# Patient Record
Sex: MALE | URBAN - METROPOLITAN AREA
[De-identification: names, ages, dates, MRNs, and addresses within clinical notes are randomized per-mention and may not be internally consistent; named-entity substitution may affect disease eponyms.]

---

## 2017-02-15 NOTE — PATIENT DISCUSSION
Continue: PreserVision AREDS 2 (vit c,g-vc-lttup-lutein-zeaxan): capsule: 046-221-97-7 mg-unit-mg-mg 1 capsule twice a day by mouth

## 2017-04-12 NOTE — PATIENT DISCUSSION
Continue: PreserVision AREDS 2 (vit c,v-mq-ioegw-lutein-zeaxan): capsule: 803-483-52-6 mg-unit-mg-mg 1 capsule twice a day by mouth

## 2017-04-12 NOTE — PATIENT DISCUSSION
CATARACTS OU:  NOT MEDICALLY INDICATED AT THIS TIME. CONTINUE TO MONITOR. ADVISED PT WE CAN REFER FOR REFRACTION WITH GENERAL OPHTHALMOLOGY IF THEY FEEL GLASSES RX NEEDS TO BE UPDATED.

## 2017-06-14 NOTE — PATIENT DISCUSSION
Continue: PreserVision AREDS 2 (vit c,k-gi-koyjc-lutein-zeaxan): capsule: 018-469-18-5 mg-unit-mg-mg 1 capsule twice a day by mouth

## 2017-06-16 NOTE — PATIENT DISCUSSION
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

## 2017-06-16 NOTE — PATIENT DISCUSSION
Continue: PreserVision AREDS 2 (vit c,m-io-djzcp-lutein-zeaxan): capsule: 663-547-93-5 mg-unit-mg-mg 1 capsule twice a day by mouth

## 2017-06-19 NOTE — PATIENT DISCUSSION
INFLAMMATION BETTER, BUT STILL UNABLE TO VIEW THE BACK OF THE EYE. BSCAN SHOWED NO ABCESS OR RD. CONTINUE DROPS AS DIRECTED AND FOLLOW UP IN 3 DAYS.

## 2017-06-19 NOTE — PATIENT DISCUSSION
Continue: PreserVision AREDS 2 (vit c,z-ru-kidso-lutein-zeaxan): capsule: 325-221-15-1 mg-unit-mg-mg 1 capsule twice a day by mouth

## 2017-06-22 NOTE — PATIENT DISCUSSION
Continue: PreserVision AREDS 2 (vit c,h-fq-zcjvf-lutein-zeaxan): capsule: 742-105-48-1 mg-unit-mg-mg 1 capsule twice a day by mouth

## 2017-06-22 NOTE — PATIENT DISCUSSION
CONTINUE DROPS AS DIRECTED. WILL DO BSCAN ON MONDAY TO VIEW RETINA. FIBRIN OBSCURRING PUPIL ALMOST RESOLVED TODAY.

## 2017-06-26 NOTE — PATIENT DISCUSSION
CONTINUE DROPS AS DIRECTED. RESTART LEVAQUIN 500MG;  FIBRIN OBSCURRING PUPIL RESOLVED TODAY. WILL CONTINUE TO MONITOR CLOSELY.

## 2017-06-26 NOTE — PATIENT DISCUSSION
Continue: PreserVision AREDS 2 (vit c,t-bz-tlgqw-lutein-zeaxan): capsule: 552-038-15-2 mg-unit-mg-mg 1 capsule twice a day by mouth

## 2017-06-29 NOTE — PATIENT DISCUSSION
Continue: PreserVision AREDS 2 (vit c,v-rs-bytlh-lutein-zeaxan): capsule: 204-591-26-2 mg-unit-mg-mg 1 capsule twice a day by mouth

## 2017-07-03 NOTE — PATIENT DISCUSSION
BSCAN IS CLEARING. PT HESITANT ABOUT SURGERY. ADVISED PT OK TO FINISH LEVAQUIN THEN STOP, BUT CONTINUE DROPS [id number]. PT TO CALL IF ANY CHANGES IN FOR THE WORSE.

## 2017-07-03 NOTE — PATIENT DISCUSSION
Continue: PreserVision AREDS 2 (vit c,f-on-frvtt-lutein-zeaxan): capsule: 275-367-87-8 mg-unit-mg-mg 1 capsule twice a day by mouth

## 2017-07-10 NOTE — PATIENT DISCUSSION
Continue: PreserVision AREDS 2 (vit c,u-zx-usgwm-lutein-zeaxan): capsule: 891-567-45-0 mg-unit-mg-mg 1 capsule twice a day by mouth

## 2017-07-17 NOTE — PATIENT DISCUSSION
Continue: PreserVision AREDS 2 (vit c,w-gq-ivvzc-lutein-zeaxan): capsule: 394-014-58-6 mg-unit-mg-mg 1 capsule twice a day by mouth

## 2017-07-17 NOTE — PATIENT DISCUSSION
STILL NO VIEW OF RETINA. BSCAN SHOWS POSS ERM/THICKENED POSTERIOR HYALOID, BUT NO DETACHMENT. PT STILL PREFERS TO WAIT TO SEE IF WILL CLEAR ALONE VS SURGERY. WILL MONITOR CLOSELY. PT TO CALL WITH ANY NEW SYMPTOMS.

## 2017-07-31 NOTE — PATIENT DISCUSSION
Continue: PreserVision AREDS 2 (vit c,h-ju-mxtnp-lutein-zeaxan): capsule: 537-070-63-2 mg-unit-mg-mg 1 capsule twice a day by mouth

## 2017-08-16 NOTE — PATIENT DISCUSSION
Continue: PreserVision AREDS 2 (vit c,f-xm-plkan-lutein-zeaxan): capsule: 239-504-97-3 mg-unit-mg-mg 1 capsule twice a day by mouth

## 2017-08-16 NOTE — PATIENT DISCUSSION
NEW RETINAL DETACHMENT OD: DISCUSSED OPTION OF SURGERY W/ RISKS VS BENEFITS. UNLIKELY TO IMPROVE VISION. WILL DISCUSS WITH RODNEY BARCENAS TO SEE IF THEY WOULD RECOMMEND SURGERY.

## 2017-08-23 NOTE — PATIENT DISCUSSION
Continue: PreserVision AREDS 2 (vit c,w-ub-wufug-lutein-zeaxan): capsule: 409-325-56-6 mg-unit-mg-mg 1 capsule twice a day by mouth

## 2017-08-23 NOTE — PATIENT DISCUSSION
NO SIGN OF RECURRENT INFECTION. ADVISED PT TO CONTINUE ANTIBIOTICS DROPS AS DIRECTED AND ALSO RESTART DUREZOL Q3HR OD.   ALSO GAVE WRITTEN RX FOR ATROPINE Q12HRS OD.

## 2017-09-06 NOTE — PATIENT DISCUSSION
AMD WET OS: STABLE - WILL MONITOR CLOSELY DUE TO BEING MONOCULAR AFTER ENDOPHTHALMITIS IN THE RIGHT EYE -  NO INJECTION NEEDED TODAY. PRESCRIBE AREDS 2 VITAMINS / AMSLER GRID QD/ UV PROTECTION. SMOKING CESSATION EMPHASIZED. RETURN FOR FOLLOW-UP AS SCHEDULED. CALL IF ANY NEW CHANGES OR CONCERNS.

## 2017-10-04 NOTE — PATIENT DISCUSSION
AMD WET OS: STABLE -  NO INJECTION NEEDED TODAY. PRESCRIBE AREDS 2 VITAMINS / AMSLER GRID QD/ UV PROTECTION. SMOKING CESSATION EMPHASIZED. RETURN FOR FOLLOW-UP AS SCHEDULED. CALL IF ANY NEW CHANGES OR CONCERNS.

## 2017-10-04 NOTE — PATIENT DISCUSSION
Continue: PreserVision AREDS 2 (vit c,v-gm-zsenf-lutein-zeaxan): capsule: 589-919-63-4 mg-unit-mg-mg 1 capsule twice a day by mouth

## 2018-02-21 NOTE — PATIENT DISCUSSION
CHRONIC TOTAL RETINAL DETACHMENT OD: NLP VISION TODAY DUE TO OPAQUE VITREOUS AND AC HEMORRHAGE AFTER URGENT CATARACT EXTRACTION SURGERY FOR PHACO-ANAPHYLAXIS. TX OPTIONS DISCUSSED, INCLUDING VITRECTOMY SURGERY. GIVEN HER GUARDED VISUAL PROGNOSIS (PRE-OP LP VISION ONLY), SHE ELECTED TO CONTINUE WITH OBSERVATION AND POSTOP CARE ONLY.

## 2018-02-21 NOTE — PATIENT DISCUSSION
Continue: Besivance (besifloxacin): drops,suspension: 0.6% 1 drop twice a day as directed into right eye

## 2018-02-21 NOTE — PATIENT DISCUSSION
Continue: PreserVision AREDS 2 (vit c,x-jw-mituy-lutein-zeaxan): capsule: 899-310-70-1 mg-unit-mg-mg 1 capsule twice a day by mouth

## 2018-02-21 NOTE — PATIENT DISCUSSION
Discussed vitrectomy surgery including risks, expected benefits, and alternatives. Reviewed potential for complications including infection, bleeding, retinal tear/detachment, glaucoma, cornea decompensation, need for additional procedures, and lack of improvement or decrease/loss in vision.

## 2018-02-21 NOTE — PATIENT DISCUSSION
RETINA IS ATTACHED OS; NO HOLES OR TEARS SEEN ON DILATED EXAM TODAY.  RETINAL DETACHMENT SIGNS AND SYMPTOMS REVIEWED

## 2018-02-21 NOTE — PATIENT DISCUSSION
AMD (WET), OS:  REVIEWED RISK AND BENEFITS OF TREAT AND EXTEND REGIMEN VS PRN TREATMENT. THE PATIENT PREFERS TO CONTINUE WITH PRN TREATMENT OS. RETURN TO DR Roslyn Santos AS SCHEDULED.

## 2018-02-21 NOTE — PATIENT DISCUSSION
RETURN TO DR GUEVARA IN 1 WEEK FOR POSTOP CARE. RETURN TO DR Daniel Trotter IN 1 MONTH FOR DILATED EXAM OU.

## 2021-02-08 ENCOUNTER — PREPPED CHART (OUTPATIENT)
Dept: URBAN - METROPOLITAN AREA CLINIC 16 | Facility: CLINIC | Age: 51
End: 2021-02-08

## 2022-02-08 ASSESSMENT — TONOMETRY
OD_IOP_MMHG: 17
OS_IOP_MMHG: 17

## 2022-08-29 PROBLEM — F17.200 TOBACCO USE DISORDER: Status: ACTIVE | Noted: 2022-08-29

## 2022-08-29 PROBLEM — G47.33 OBSTRUCTIVE SLEEP APNEA: Status: ACTIVE | Noted: 2022-08-29

## 2022-08-29 PROBLEM — K43.2 VENTRAL INCISIONAL HERNIA: Status: ACTIVE | Noted: 2022-08-29

## 2022-08-29 PROBLEM — E66.9 OBESITY (BMI 30.0-34.9): Status: ACTIVE | Noted: 2022-08-29

## 2022-08-29 PROBLEM — E04.1 THYROID NODULE: Status: ACTIVE | Noted: 2022-08-29

## 2022-08-29 PROBLEM — G47.09 OTHER INSOMNIA: Status: ACTIVE | Noted: 2022-08-29

## 2022-08-29 PROBLEM — Z96.641 HISTORY OF RIGHT HIP REPLACEMENT: Status: ACTIVE | Noted: 2022-08-29

## 2025-04-29 ENCOUNTER — COMPREHENSIVE EXAM (OUTPATIENT)
Age: 55
End: 2025-04-29

## 2025-04-29 DIAGNOSIS — H25.13: ICD-10-CM

## 2025-04-29 DIAGNOSIS — H52.4: ICD-10-CM

## 2025-04-29 DIAGNOSIS — H04.123: ICD-10-CM

## 2025-04-29 DIAGNOSIS — Z01.00: ICD-10-CM

## 2025-04-29 PROCEDURE — 92014 COMPRE OPH EXAM EST PT 1/>: CPT
